# Patient Record
Sex: MALE | URBAN - METROPOLITAN AREA
[De-identification: names, ages, dates, MRNs, and addresses within clinical notes are randomized per-mention and may not be internally consistent; named-entity substitution may affect disease eponyms.]

---

## 2020-10-13 ENCOUNTER — NEW PATIENT (OUTPATIENT)
Dept: URBAN - METROPOLITAN AREA CLINIC 24 | Facility: CLINIC | Age: 65
End: 2020-10-13
Payer: COMMERCIAL

## 2020-10-13 PROCEDURE — 92235 FLUORESCEIN ANGRPH MLTIFRAME: CPT | Performed by: OPHTHALMOLOGY

## 2020-10-13 PROCEDURE — 92004 COMPRE OPH EXAM NEW PT 1/>: CPT | Performed by: OPHTHALMOLOGY

## 2020-10-13 PROCEDURE — 67210 TREATMENT OF RETINAL LESION: CPT | Performed by: OPHTHALMOLOGY

## 2020-10-13 PROCEDURE — 92134 CPTRZ OPH DX IMG PST SGM RTA: CPT | Performed by: OPHTHALMOLOGY

## 2020-10-13 ASSESSMENT — INTRAOCULAR PRESSURE
OD: 12
OS: 11

## 2020-10-27 ENCOUNTER — FOLLOW UP ESTABLISHED (OUTPATIENT)
Dept: URBAN - METROPOLITAN AREA CLINIC 24 | Facility: CLINIC | Age: 65
End: 2020-10-27
Payer: COMMERCIAL

## 2020-10-27 DIAGNOSIS — Z79.4 LONG TERM (CURRENT) USE OF INSULIN: ICD-10-CM

## 2020-10-27 PROCEDURE — 92134 CPTRZ OPH DX IMG PST SGM RTA: CPT | Performed by: OPHTHALMOLOGY

## 2020-10-27 PROCEDURE — 67228 TREATMENT X10SV RETINOPATHY: CPT | Performed by: OPHTHALMOLOGY

## 2020-10-27 ASSESSMENT — INTRAOCULAR PRESSURE
OS: 8
OD: 9

## 2020-11-16 ENCOUNTER — FOLLOW UP ESTABLISHED (OUTPATIENT)
Dept: URBAN - METROPOLITAN AREA CLINIC 24 | Facility: CLINIC | Age: 65
End: 2020-11-16
Payer: COMMERCIAL

## 2020-11-16 PROCEDURE — 92134 CPTRZ OPH DX IMG PST SGM RTA: CPT | Performed by: OPHTHALMOLOGY

## 2020-11-16 ASSESSMENT — INTRAOCULAR PRESSURE
OD: 8
OS: 8

## 2020-12-22 ENCOUNTER — FOLLOW UP ESTABLISHED (OUTPATIENT)
Dept: URBAN - METROPOLITAN AREA CLINIC 24 | Facility: CLINIC | Age: 65
End: 2020-12-22
Payer: COMMERCIAL

## 2020-12-22 PROCEDURE — 92134 CPTRZ OPH DX IMG PST SGM RTA: CPT | Performed by: OPHTHALMOLOGY

## 2020-12-22 ASSESSMENT — INTRAOCULAR PRESSURE
OS: 15
OD: 18

## 2021-01-25 ENCOUNTER — FOLLOW UP ESTABLISHED (OUTPATIENT)
Dept: URBAN - METROPOLITAN AREA CLINIC 24 | Facility: CLINIC | Age: 66
End: 2021-01-25
Payer: COMMERCIAL

## 2021-01-25 PROCEDURE — 92134 CPTRZ OPH DX IMG PST SGM RTA: CPT | Performed by: OPHTHALMOLOGY

## 2021-01-25 ASSESSMENT — INTRAOCULAR PRESSURE
OD: 14
OS: 12

## 2021-02-22 ENCOUNTER — FOLLOW UP ESTABLISHED (OUTPATIENT)
Dept: URBAN - METROPOLITAN AREA CLINIC 24 | Facility: CLINIC | Age: 66
End: 2021-02-22
Payer: COMMERCIAL

## 2021-02-22 PROCEDURE — 92014 COMPRE OPH EXAM EST PT 1/>: CPT | Performed by: OPHTHALMOLOGY

## 2021-02-22 PROCEDURE — 92134 CPTRZ OPH DX IMG PST SGM RTA: CPT | Performed by: OPHTHALMOLOGY

## 2021-02-22 ASSESSMENT — INTRAOCULAR PRESSURE
OD: 9
OS: 10

## 2021-03-22 ENCOUNTER — FOLLOW UP ESTABLISHED (OUTPATIENT)
Dept: URBAN - METROPOLITAN AREA CLINIC 24 | Facility: CLINIC | Age: 66
End: 2021-03-22
Payer: COMMERCIAL

## 2021-03-22 DIAGNOSIS — E11.3413 TYPE 2 DIABETES MELLITUS WITH SEVERE NONPROLIFERATIVE DIABETIC RETINOPATHY WITH MACULAR EDEMA, BILATERAL: Primary | ICD-10-CM

## 2021-03-22 PROCEDURE — 92134 CPTRZ OPH DX IMG PST SGM RTA: CPT | Performed by: OPHTHALMOLOGY

## 2021-03-22 ASSESSMENT — INTRAOCULAR PRESSURE
OD: 9
OS: 10

## 2021-04-27 ENCOUNTER — OFFICE VISIT (OUTPATIENT)
Dept: URBAN - METROPOLITAN AREA CLINIC 24 | Facility: CLINIC | Age: 66
End: 2021-04-27
Payer: COMMERCIAL

## 2021-04-27 PROCEDURE — 92134 CPTRZ OPH DX IMG PST SGM RTA: CPT | Performed by: OPHTHALMOLOGY

## 2021-04-27 ASSESSMENT — INTRAOCULAR PRESSURE
OS: 13
OD: 12

## 2021-04-27 NOTE — IMPRESSION/PLAN
Impression: Type 2 diabetes mellitus with ophthalmic complications Plan: Stressed the importance compliance, blood glucose, and blood pressure control in preventing progression of retinopathy and/or maculopathy and potentially irreversible vision loss and blindness

## 2021-04-27 NOTE — IMPRESSION/PLAN
Impression: Diabetes mellitus Type 2 with severe non-proliferative retinopathy with macular edema, bilateral Plan: Visually significant DME OD>OS - now improving, continue [id number] avastin OU Avastin 3/3 OU given today without complication. R/B/A discussed at length.  Review in 4 weeks for dilated exam.

## 2021-06-08 ENCOUNTER — OFFICE VISIT (OUTPATIENT)
Dept: URBAN - METROPOLITAN AREA CLINIC 24 | Facility: CLINIC | Age: 66
End: 2021-06-08
Payer: COMMERCIAL

## 2021-06-08 DIAGNOSIS — E11.39 TYPE 2 DIABETES MELLITUS WITH OTHER DIABETIC OPHTHALMIC COMPLICATION: ICD-10-CM

## 2021-06-08 PROCEDURE — 92134 CPTRZ OPH DX IMG PST SGM RTA: CPT | Performed by: OPHTHALMOLOGY

## 2021-06-08 PROCEDURE — 92014 COMPRE OPH EXAM EST PT 1/>: CPT | Performed by: OPHTHALMOLOGY

## 2021-06-08 ASSESSMENT — INTRAOCULAR PRESSURE
OS: 12
OD: 10

## 2021-06-08 NOTE — IMPRESSION/PLAN
Impression: Diabetes mellitus Type 2 with severe non-proliferative retinopathy with macular edema, bilateral Plan: Visually significant DME OS>OD - persists despite [id number] avastin OU. Plan on micropulse OS in 2 weeks followed by micropulse OD followed by adjunctive Eylea OU Avastin OU given today without complication. R/B/A discussed at length.  Review in 2-4 weeks for micropulse OS

## 2021-07-12 ENCOUNTER — OFFICE VISIT (OUTPATIENT)
Dept: URBAN - METROPOLITAN AREA CLINIC 24 | Facility: CLINIC | Age: 66
End: 2021-07-12
Payer: COMMERCIAL

## 2021-07-12 PROCEDURE — 92134 CPTRZ OPH DX IMG PST SGM RTA: CPT | Performed by: OPHTHALMOLOGY

## 2021-07-12 PROCEDURE — 67220 TREATMENT OF CHOROID LESION: CPT | Performed by: OPHTHALMOLOGY

## 2021-07-12 ASSESSMENT — INTRAOCULAR PRESSURE
OS: 13
OD: 12

## 2021-07-12 NOTE — IMPRESSION/PLAN
Impression: Diabetes mellitus Type 2 with severe non-proliferative retinopathy with macular edema, bilateral Plan: Visually significant DME OS>OD - persists despite [id number] avastin OU. Plan on micropulse OS in 2 weeks followed by micropulse OD followed by adjunctive Eylea OU Micropulse OS performed today (2/98/14) without complication. R/B/A discussed at length.  Review in 2-4 weeks for micropulse OD

## 2021-08-17 ENCOUNTER — OFFICE VISIT (OUTPATIENT)
Dept: URBAN - METROPOLITAN AREA CLINIC 24 | Facility: CLINIC | Age: 66
End: 2021-08-17
Payer: COMMERCIAL

## 2021-08-17 PROCEDURE — 92134 CPTRZ OPH DX IMG PST SGM RTA: CPT | Performed by: OPHTHALMOLOGY

## 2021-08-17 PROCEDURE — 67210 TREATMENT OF RETINAL LESION: CPT | Performed by: OPHTHALMOLOGY

## 2021-08-17 ASSESSMENT — INTRAOCULAR PRESSURE
OD: 11
OS: 12

## 2021-08-17 NOTE — IMPRESSION/PLAN
Impression: Diabetes mellitus Type 2 with severe non-proliferative retinopathy with macular edema, bilateral Plan: Visually significant DME OS>OD - persists despite [id number] avastin OU. Micropulse OS performed 6/67/38 without complication. Micropulse OD performed today (3/90/47) without complication. R/B/A discussed at length.  Review in 2-4 weeks for adjunctive Eylea OU

## 2021-08-31 ENCOUNTER — OFFICE VISIT (OUTPATIENT)
Dept: URBAN - METROPOLITAN AREA CLINIC 24 | Facility: CLINIC | Age: 66
End: 2021-08-31
Payer: COMMERCIAL

## 2021-08-31 PROCEDURE — 92134 CPTRZ OPH DX IMG PST SGM RTA: CPT | Performed by: OPHTHALMOLOGY

## 2021-08-31 ASSESSMENT — INTRAOCULAR PRESSURE
OS: 12
OD: 10

## 2021-08-31 NOTE — IMPRESSION/PLAN
Impression: Diabetes mellitus Type 2 with severe non-proliferative retinopathy with macular edema, bilateral Plan: Visually significant DME OS>OD - persists despite [id number] avastin OU. Micropulse OS performed 8/88/15 without complication. Micropulse OD performed 7/69/30 without complication. Continue with adjunctive Eylea OU as planned Eylea 1/3 OU administered today without complication. R/B/A discussed at length.  Review in 4 weeks for Eylea 2/3 OU

## 2021-09-28 ENCOUNTER — OFFICE VISIT (OUTPATIENT)
Dept: URBAN - METROPOLITAN AREA CLINIC 24 | Facility: CLINIC | Age: 66
End: 2021-09-28
Payer: COMMERCIAL

## 2021-09-28 PROCEDURE — 92134 CPTRZ OPH DX IMG PST SGM RTA: CPT | Performed by: OPHTHALMOLOGY

## 2021-09-28 ASSESSMENT — INTRAOCULAR PRESSURE
OS: 12
OD: 18

## 2021-09-28 NOTE — IMPRESSION/PLAN
Impression: Diabetes mellitus Type 2 with severe non-proliferative retinopathy with macular edema, bilateral Plan: Visually significant DME OS>OD - persists despite [id number] avastin OU. Micropulse OS performed 6/88/00 without complication. Micropulse OD performed 7/05/65 without complication. Continue with adjunctive Eylea OU as planned Eylea 2/3 OU administered today without complication. R/B/A discussed at length.  Review in 4 weeks for Eylea 3/3 OU

## 2021-11-11 ENCOUNTER — OFFICE VISIT (OUTPATIENT)
Dept: URBAN - METROPOLITAN AREA CLINIC 30 | Facility: CLINIC | Age: 66
End: 2021-11-11
Payer: COMMERCIAL

## 2021-11-11 PROCEDURE — 92134 CPTRZ OPH DX IMG PST SGM RTA: CPT | Performed by: OPHTHALMOLOGY

## 2021-11-11 ASSESSMENT — INTRAOCULAR PRESSURE
OD: 10
OS: 13

## 2021-11-11 NOTE — IMPRESSION/PLAN
Impression: Diabetes mellitus Type 2 with severe non-proliferative retinopathy with macular edema, bilateral Plan: Visually significant DME OS>OD - persists despite [id number] avastin OU. Micropulse OS performed 2/59/75 without complication. Micropulse OD performed 8/85/31 without complication. Continue with adjunctive Eylea OU as planned Eylea 3/3 OU administered today without complication. R/B/A discussed at length.  Review in 4 weeks for exam

## 2021-12-07 ENCOUNTER — OFFICE VISIT (OUTPATIENT)
Dept: URBAN - METROPOLITAN AREA CLINIC 24 | Facility: CLINIC | Age: 66
End: 2021-12-07
Payer: COMMERCIAL

## 2021-12-07 PROCEDURE — 92014 COMPRE OPH EXAM EST PT 1/>: CPT | Performed by: OPHTHALMOLOGY

## 2021-12-07 PROCEDURE — 92134 CPTRZ OPH DX IMG PST SGM RTA: CPT | Performed by: OPHTHALMOLOGY

## 2021-12-07 ASSESSMENT — INTRAOCULAR PRESSURE
OS: 14
OD: 12

## 2021-12-07 NOTE — IMPRESSION/PLAN
Impression: Diabetes mellitus Type 2 with severe non-proliferative retinopathy with macular edema, bilateral Plan: Visually significant DME OS>OD - persists despite [id number] avastin OU. Micropulse OS performed 6/94/42 without complication. Micropulse OD performed 3/01/89 without complication. Continue with adjunctive Eylea OU as planned Eylea 1/3 OU administered today without complication. R/B/A discussed at length.  Review in 4 weeks for Eylea 2/3 OU

## 2022-01-04 ENCOUNTER — OFFICE VISIT (OUTPATIENT)
Dept: URBAN - METROPOLITAN AREA CLINIC 24 | Facility: CLINIC | Age: 67
End: 2022-01-04
Payer: COMMERCIAL

## 2022-01-04 PROCEDURE — 92134 CPTRZ OPH DX IMG PST SGM RTA: CPT | Performed by: OPHTHALMOLOGY

## 2022-01-04 ASSESSMENT — INTRAOCULAR PRESSURE
OS: 9
OD: 13

## 2022-01-04 NOTE — IMPRESSION/PLAN
Impression: Diabetes mellitus Type 2 with severe non-proliferative retinopathy with macular edema, bilateral Plan: Visually significant DME OS>OD - persists despite [id number] avastin OU. Micropulse OS performed 3/09/62 without complication. Micropulse OD performed 7/89/15 without complication. Continue with adjunctive Eylea OU as planned Eylea 2/3 OU administered today without complication. R/B/A discussed at length. Review in 4 weeks for Eylea 3/3 OU.

## 2022-02-01 ENCOUNTER — OFFICE VISIT (OUTPATIENT)
Dept: URBAN - METROPOLITAN AREA CLINIC 24 | Facility: CLINIC | Age: 67
End: 2022-02-01
Payer: COMMERCIAL

## 2022-02-01 PROCEDURE — 92134 CPTRZ OPH DX IMG PST SGM RTA: CPT | Performed by: OPHTHALMOLOGY

## 2022-02-01 ASSESSMENT — INTRAOCULAR PRESSURE
OS: 10
OD: 12

## 2022-02-01 NOTE — IMPRESSION/PLAN
Impression: Diabetes mellitus Type 2 with severe non-proliferative retinopathy with macular edema, bilateral Plan: Visually significant DME OS>OD - persists despite [id number] avastin OU. Micropulse OS performed 1/53/39 without complication. Micropulse OD performed 0/19/19 without complication. Continue with adjunctive Eylea OU as planned Eylea 3/3 OU administered today without complication. R/B/A discussed at length.  Review in 4 weeks for a dilated exam.

## 2022-03-01 ENCOUNTER — OFFICE VISIT (OUTPATIENT)
Dept: URBAN - METROPOLITAN AREA CLINIC 24 | Facility: CLINIC | Age: 67
End: 2022-03-01
Payer: COMMERCIAL

## 2022-03-01 PROCEDURE — 92014 COMPRE OPH EXAM EST PT 1/>: CPT | Performed by: OPHTHALMOLOGY

## 2022-03-01 PROCEDURE — 92134 CPTRZ OPH DX IMG PST SGM RTA: CPT | Performed by: OPHTHALMOLOGY

## 2022-03-01 ASSESSMENT — INTRAOCULAR PRESSURE
OS: 11
OD: 10

## 2022-03-01 NOTE — IMPRESSION/PLAN
Impression: Diabetes mellitus Type 2 with severe non-proliferative retinopathy with macular edema, bilateral Plan: Visually significant DME OS>OD - persists despite [id number] avastin OU. Micropulse OS performed 8/52/17 without complication. Micropulse OD performed 7/91/65 without complication. Continue with adjunctive Eylea OU as planned Eylea 1/3 OU administered today without complication. R/B/A discussed at length.  Review in 4 weeks for Eylea 2/3 OU

## 2022-04-04 ENCOUNTER — OFFICE VISIT (OUTPATIENT)
Dept: URBAN - METROPOLITAN AREA CLINIC 24 | Facility: CLINIC | Age: 67
End: 2022-04-04
Payer: COMMERCIAL

## 2022-04-04 PROCEDURE — 92134 CPTRZ OPH DX IMG PST SGM RTA: CPT | Performed by: OPHTHALMOLOGY

## 2022-04-04 ASSESSMENT — INTRAOCULAR PRESSURE
OS: 14
OD: 14

## 2022-04-04 NOTE — IMPRESSION/PLAN
Impression: Diabetes mellitus Type 2 with severe non-proliferative retinopathy with macular edema, bilateral Plan: Visually significant DME OS>OD - persists despite [id number] avastin OU. Micropulse OS performed 5/32/62 without complication. Micropulse OD performed 8/54/02 without complication. Continue with adjunctive Eylea OU as planned Eylea 2/3 OU administered today without complication. R/B/A discussed at length.  Review in 4 weeks for Eylea 3/3 OU

## 2022-05-02 ENCOUNTER — OFFICE VISIT (OUTPATIENT)
Dept: URBAN - METROPOLITAN AREA CLINIC 24 | Facility: CLINIC | Age: 67
End: 2022-05-02
Payer: COMMERCIAL

## 2022-05-02 DIAGNOSIS — E11.3413 TYPE 2 DIABETES MELLITUS WITH SEVERE NONPROLIFERATIVE DIABETIC RETINOPATHY WITH MACULAR EDEMA, BILATERAL: Primary | ICD-10-CM

## 2022-05-02 DIAGNOSIS — Z79.4 LONG TERM (CURRENT) USE OF INSULIN: ICD-10-CM

## 2022-05-02 DIAGNOSIS — E11.39 TYPE 2 DIABETES MELLITUS WITH OTHER DIABETIC OPHTHALMIC COMPLICATION: ICD-10-CM

## 2022-05-02 PROCEDURE — 92134 CPTRZ OPH DX IMG PST SGM RTA: CPT | Performed by: OPHTHALMOLOGY

## 2022-05-02 ASSESSMENT — INTRAOCULAR PRESSURE
OS: 13
OD: 15

## 2022-05-02 NOTE — IMPRESSION/PLAN
Impression: Diabetes mellitus Type 2 with severe non-proliferative retinopathy with macular edema, bilateral Plan: Visually significant DME OS>OD - persists despite [id number] avastin OU. Micropulse OS performed 4/39/06 without complication. Micropulse OD performed 2/67/01 without complication. Continue with adjunctive Eylea OU as planned Eylea 3/3 OU administered today without complication. R/B/A discussed at length.  Review in 4 weeks for dilated exam, possible FA

## 2022-06-07 ENCOUNTER — OFFICE VISIT (OUTPATIENT)
Dept: URBAN - METROPOLITAN AREA CLINIC 24 | Facility: CLINIC | Age: 67
End: 2022-06-07
Payer: COMMERCIAL

## 2022-06-07 DIAGNOSIS — E11.3413 TYPE 2 DIABETES MELLITUS WITH SEVERE NONPROLIFERATIVE DIABETIC RETINOPATHY WITH MACULAR EDEMA, BILATERAL: Primary | ICD-10-CM

## 2022-06-07 DIAGNOSIS — Z79.4 LONG TERM (CURRENT) USE OF INSULIN: ICD-10-CM

## 2022-06-07 DIAGNOSIS — E11.39 TYPE 2 DIABETES MELLITUS WITH OTHER DIABETIC OPHTHALMIC COMPLICATION: ICD-10-CM

## 2022-06-07 PROCEDURE — 92134 CPTRZ OPH DX IMG PST SGM RTA: CPT | Performed by: OPHTHALMOLOGY

## 2022-06-07 PROCEDURE — 92235 FLUORESCEIN ANGRPH MLTIFRAME: CPT | Performed by: OPHTHALMOLOGY

## 2022-06-07 PROCEDURE — 92014 COMPRE OPH EXAM EST PT 1/>: CPT | Performed by: OPHTHALMOLOGY

## 2022-06-07 ASSESSMENT — INTRAOCULAR PRESSURE
OS: 13
OD: 14

## 2022-06-07 NOTE — IMPRESSION/PLAN
Impression: Diabetes mellitus Type 2 with severe non-proliferative retinopathy with macular edema, bilateral Plan: Visually significant DME OU - now minimal OD with series of Eylea, resolving OS. Continue [id number] Eylea OS and extend to 8 weeks Eylea OD Eylea 1/3 OU administered today without complication. R/B/A discussed at length.  Review in 4 weeks for Eylea 2/3 OS

## 2022-07-19 ENCOUNTER — OFFICE VISIT (OUTPATIENT)
Dept: URBAN - METROPOLITAN AREA CLINIC 24 | Facility: CLINIC | Age: 67
End: 2022-07-19
Payer: COMMERCIAL

## 2022-07-19 DIAGNOSIS — Z79.4 LONG TERM (CURRENT) USE OF INSULIN: ICD-10-CM

## 2022-07-19 DIAGNOSIS — E11.3413 TYPE 2 DIABETES MELLITUS WITH SEVERE NONPROLIFERATIVE DIABETIC RETINOPATHY WITH MACULAR EDEMA, BILATERAL: Primary | ICD-10-CM

## 2022-07-19 PROCEDURE — 67028 INJECTION EYE DRUG: CPT | Performed by: OPHTHALMOLOGY

## 2022-07-19 PROCEDURE — 92134 CPTRZ OPH DX IMG PST SGM RTA: CPT | Performed by: OPHTHALMOLOGY

## 2022-07-19 ASSESSMENT — INTRAOCULAR PRESSURE
OS: 13
OD: 15

## 2022-07-19 NOTE — IMPRESSION/PLAN
Impression: Long term (current) use of insulin Plan: Stressed the importance compliance, blood glucose, and blood pressure control in preventing progression of retinopathy and/or maculopathy and potentially irreversible vision loss and blindness

## 2022-07-19 NOTE — IMPRESSION/PLAN
Impression: Diabetes mellitus Type 2 with severe non-proliferative retinopathy with macular edema, bilateral Plan: Visually significant DME OU - now minimal OD with series of Eylea, resolving OS. Continue [id number] Eylea OS and extend to 8 weeks Eylea OD Eylea 2/3 OS administered today without complication. R/B/A discussed at length.  Review in 4 weeks for Eylea 2/3 OU

## 2022-08-16 ENCOUNTER — OFFICE VISIT (OUTPATIENT)
Dept: URBAN - METROPOLITAN AREA CLINIC 24 | Facility: CLINIC | Age: 67
End: 2022-08-16
Payer: COMMERCIAL

## 2022-08-16 DIAGNOSIS — Z79.4 LONG TERM (CURRENT) USE OF INSULIN: ICD-10-CM

## 2022-08-16 DIAGNOSIS — E11.3413 TYPE 2 DIABETES MELLITUS WITH SEVERE NONPROLIFERATIVE DIABETIC RETINOPATHY WITH MACULAR EDEMA, BILATERAL: Primary | ICD-10-CM

## 2022-08-16 PROCEDURE — 92134 CPTRZ OPH DX IMG PST SGM RTA: CPT | Performed by: OPHTHALMOLOGY

## 2022-08-16 ASSESSMENT — INTRAOCULAR PRESSURE
OD: 14
OS: 14

## 2022-08-16 NOTE — IMPRESSION/PLAN
Impression: Diabetes mellitus Type 2 with severe non-proliferative retinopathy with macular edema, bilateral Plan: Visually significant DME OU - now minimal OD with series of Eylea, resolving OS. Continue [id number] Eylea OS and extend to 8 weeks Eylea OD Eylea 3/3 OU administered today without complication. R/B/A discussed at length.  Review in 4 weeks for dilated exam, likely Eylea OS

## 2022-10-03 ENCOUNTER — OFFICE VISIT (OUTPATIENT)
Dept: URBAN - METROPOLITAN AREA CLINIC 24 | Facility: CLINIC | Age: 67
End: 2022-10-03
Payer: COMMERCIAL

## 2022-10-03 DIAGNOSIS — E11.3413 TYPE 2 DIABETES MELLITUS WITH SEVERE NONPROLIFERATIVE DIABETIC RETINOPATHY WITH MACULAR EDEMA, BILATERAL: Primary | ICD-10-CM

## 2022-10-03 DIAGNOSIS — Z79.4 LONG TERM (CURRENT) USE OF INSULIN: ICD-10-CM

## 2022-10-03 PROCEDURE — 92134 CPTRZ OPH DX IMG PST SGM RTA: CPT | Performed by: OPHTHALMOLOGY

## 2022-10-03 PROCEDURE — 67028 INJECTION EYE DRUG: CPT | Performed by: OPHTHALMOLOGY

## 2022-10-03 PROCEDURE — 92014 COMPRE OPH EXAM EST PT 1/>: CPT | Performed by: OPHTHALMOLOGY

## 2022-10-03 ASSESSMENT — INTRAOCULAR PRESSURE
OS: 12
OD: 13

## 2022-10-03 NOTE — IMPRESSION/PLAN
Impression: Diabetes mellitus Type 2 with severe non-proliferative retinopathy with macular edema, bilateral Plan: DME OU - now minimal OD with series of Eylea, resolving OS. Continue [id number] Eylea OS and DC Eylea OD Eylea 1/3 OS administered today without complication. R/B/A discussed at length.  Review in 4 weeks for Eylea 2/3 OS

## 2022-10-31 ENCOUNTER — OFFICE VISIT (OUTPATIENT)
Dept: URBAN - METROPOLITAN AREA CLINIC 24 | Facility: CLINIC | Age: 67
End: 2022-10-31
Payer: COMMERCIAL

## 2022-10-31 DIAGNOSIS — Z79.4 LONG TERM (CURRENT) USE OF INSULIN: ICD-10-CM

## 2022-10-31 DIAGNOSIS — E11.3413 TYPE 2 DIABETES MELLITUS WITH SEVERE NONPROLIFERATIVE DIABETIC RETINOPATHY WITH MACULAR EDEMA, BILATERAL: Primary | ICD-10-CM

## 2022-10-31 PROCEDURE — 92134 CPTRZ OPH DX IMG PST SGM RTA: CPT | Performed by: OPHTHALMOLOGY

## 2022-10-31 PROCEDURE — 67028 INJECTION EYE DRUG: CPT | Performed by: OPHTHALMOLOGY

## 2022-10-31 ASSESSMENT — INTRAOCULAR PRESSURE
OS: 13
OD: 15

## 2022-10-31 NOTE — IMPRESSION/PLAN
Impression: Diabetes mellitus Type 2 with severe non-proliferative retinopathy with macular edema, bilateral Plan: DME OU - now minimal OD with series of Eylea, resolving OS. Continue [id number] Eylea OS and DC Eylea OD Eylea 2/3 OS administered today without complication. R/B/A discussed at length.  Review in 4 weeks for Eylea 3/3 OS

## 2022-12-06 ENCOUNTER — OFFICE VISIT (OUTPATIENT)
Dept: URBAN - METROPOLITAN AREA CLINIC 24 | Facility: CLINIC | Age: 67
End: 2022-12-06
Payer: MEDICARE

## 2022-12-06 DIAGNOSIS — E11.3413 TYPE 2 DIABETES MELLITUS WITH SEVERE NONPROLIFERATIVE DIABETIC RETINOPATHY WITH MACULAR EDEMA, BILATERAL: Primary | ICD-10-CM

## 2022-12-06 DIAGNOSIS — Z79.4 LONG TERM (CURRENT) USE OF INSULIN: ICD-10-CM

## 2022-12-06 PROCEDURE — 67028 INJECTION EYE DRUG: CPT | Performed by: OPHTHALMOLOGY

## 2022-12-06 PROCEDURE — 92134 CPTRZ OPH DX IMG PST SGM RTA: CPT | Performed by: OPHTHALMOLOGY

## 2022-12-06 ASSESSMENT — INTRAOCULAR PRESSURE
OD: 13
OS: 12

## 2022-12-06 NOTE — IMPRESSION/PLAN
Impression: Diabetes mellitus Type 2 with severe non-proliferative retinopathy with macular edema, bilateral Plan: DME OU - now minimal OD with series of Eylea, resolving OS. Continue [id number] Eylea OS and DC Eylea OD Eylea 3/3 OS administered today without complication. R/B/A discussed at length.  Review in 4 weeks for exam

## 2023-01-09 ENCOUNTER — OFFICE VISIT (OUTPATIENT)
Dept: URBAN - METROPOLITAN AREA CLINIC 24 | Facility: CLINIC | Age: 68
End: 2023-01-09
Payer: MEDICARE

## 2023-01-09 DIAGNOSIS — E11.3413 TYPE 2 DIABETES MELLITUS WITH SEVERE NONPROLIFERATIVE DIABETIC RETINOPATHY WITH MACULAR EDEMA, BILATERAL: Primary | ICD-10-CM

## 2023-01-09 DIAGNOSIS — Z79.4 LONG TERM (CURRENT) USE OF INSULIN: ICD-10-CM

## 2023-01-09 PROCEDURE — 99214 OFFICE O/P EST MOD 30 MIN: CPT | Performed by: OPHTHALMOLOGY

## 2023-01-09 PROCEDURE — 92134 CPTRZ OPH DX IMG PST SGM RTA: CPT | Performed by: OPHTHALMOLOGY

## 2023-01-09 PROCEDURE — 67028 INJECTION EYE DRUG: CPT | Performed by: OPHTHALMOLOGY

## 2023-01-09 ASSESSMENT — INTRAOCULAR PRESSURE
OS: 12
OD: 12

## 2023-01-09 NOTE — IMPRESSION/PLAN
Impression: Diabetes mellitus Type 2 with severe non-proliferative retinopathy with macular edema, bilateral Plan: DME OU - now minimal OD with series of Eylea, resolving OS. Continue [id number] Eylea OS and observe OD off Eylea Eylea 1/3 OS administered today without complication. R/B/A discussed at length.  Review in 4 weeks for Eylea 2/3 OS

## 2023-02-06 ENCOUNTER — PROCEDURE (OUTPATIENT)
Dept: URBAN - METROPOLITAN AREA CLINIC 24 | Facility: CLINIC | Age: 68
End: 2023-02-06
Payer: COMMERCIAL

## 2023-02-06 DIAGNOSIS — E11.3413 TYPE 2 DIABETES MELLITUS WITH SEVERE NONPROLIFERATIVE DIABETIC RETINOPATHY WITH MACULAR EDEMA, BILATERAL: Primary | ICD-10-CM

## 2023-02-06 DIAGNOSIS — Z79.4 LONG TERM (CURRENT) USE OF INSULIN: ICD-10-CM

## 2023-02-06 PROCEDURE — 92134 CPTRZ OPH DX IMG PST SGM RTA: CPT | Performed by: OPHTHALMOLOGY

## 2023-02-06 PROCEDURE — 67028 INJECTION EYE DRUG: CPT | Performed by: OPHTHALMOLOGY

## 2023-02-06 ASSESSMENT — INTRAOCULAR PRESSURE
OD: 10
OS: 12

## 2023-02-06 NOTE — IMPRESSION/PLAN
Impression: Diabetes mellitus Type 2 with severe non-proliferative retinopathy with macular edema, bilateral Plan: DME OU - now minimal OD with series of Eylea, resolving OS. Continue [id number] Eylea OS and observe OD off Eylea Eylea 2/3 OS administered today without complication. R/B/A discussed at length.  Review in 4 weeks for Eylea 3/3 OS

## 2023-03-06 ENCOUNTER — OFFICE VISIT (OUTPATIENT)
Dept: URBAN - METROPOLITAN AREA CLINIC 24 | Facility: CLINIC | Age: 68
End: 2023-03-06
Payer: COMMERCIAL

## 2023-03-06 DIAGNOSIS — E11.3413 TYPE 2 DIABETES MELLITUS WITH SEVERE NONPROLIFERATIVE DIABETIC RETINOPATHY WITH MACULAR EDEMA, BILATERAL: Primary | ICD-10-CM

## 2023-03-06 DIAGNOSIS — Z79.4 LONG TERM (CURRENT) USE OF INSULIN: ICD-10-CM

## 2023-03-06 PROCEDURE — 92134 CPTRZ OPH DX IMG PST SGM RTA: CPT | Performed by: OPHTHALMOLOGY

## 2023-03-06 PROCEDURE — 67028 INJECTION EYE DRUG: CPT | Performed by: OPHTHALMOLOGY

## 2023-03-06 ASSESSMENT — INTRAOCULAR PRESSURE
OD: 11
OS: 12

## 2023-03-06 NOTE — IMPRESSION/PLAN
Impression: Diabetes mellitus Type 2 with severe non-proliferative retinopathy with macular edema, bilateral Plan: DME OU - now minimal OD with series of Eylea, persists OS despite [id number] Eylea OS. Switch to Vabysmo OS and observe OD off Eylea Eylea 3/3 OS administered today without complication. R/B/A discussed at length.  Review in 4 weeks for a dilated exam.

## 2023-04-11 ENCOUNTER — OFFICE VISIT (OUTPATIENT)
Dept: URBAN - METROPOLITAN AREA CLINIC 24 | Facility: CLINIC | Age: 68
End: 2023-04-11
Payer: COMMERCIAL

## 2023-04-11 DIAGNOSIS — Z79.4 LONG TERM (CURRENT) USE OF INSULIN: ICD-10-CM

## 2023-04-11 DIAGNOSIS — E11.3413 TYPE 2 DIABETES MELLITUS WITH SEVERE NONPROLIFERATIVE DIABETIC RETINOPATHY WITH MACULAR EDEMA, BILATERAL: Primary | ICD-10-CM

## 2023-04-11 PROCEDURE — 92134 CPTRZ OPH DX IMG PST SGM RTA: CPT | Performed by: OPHTHALMOLOGY

## 2023-04-11 PROCEDURE — 67028 INJECTION EYE DRUG: CPT | Performed by: OPHTHALMOLOGY

## 2023-04-11 PROCEDURE — 92235 FLUORESCEIN ANGRPH MLTIFRAME: CPT | Performed by: OPHTHALMOLOGY

## 2023-04-11 PROCEDURE — 99214 OFFICE O/P EST MOD 30 MIN: CPT | Performed by: OPHTHALMOLOGY

## 2023-04-11 ASSESSMENT — INTRAOCULAR PRESSURE
OD: 12
OS: 13

## 2023-04-11 NOTE — IMPRESSION/PLAN
Impression: Diabetes mellitus Type 2 with severe non-proliferative retinopathy with macular edema, bilateral Plan: DME OU - now minimal OD with series of Eylea, persists OS despite [id number] Eylea OS. Switch to Vabysmo OS and observe OD off Eylea. Vabysmo 1/3 OS administered today without complication. R/B/A discussed at length.  Review in 4 weeks for Vabysmo 2/3 OS

## 2023-05-09 ENCOUNTER — OFFICE VISIT (OUTPATIENT)
Dept: URBAN - METROPOLITAN AREA CLINIC 24 | Facility: CLINIC | Age: 68
End: 2023-05-09
Payer: COMMERCIAL

## 2023-05-09 DIAGNOSIS — Z79.4 LONG TERM (CURRENT) USE OF INSULIN: ICD-10-CM

## 2023-05-09 DIAGNOSIS — E11.3413 TYPE 2 DIABETES MELLITUS WITH SEVERE NONPROLIFERATIVE DIABETIC RETINOPATHY WITH MACULAR EDEMA, BILATERAL: Primary | ICD-10-CM

## 2023-05-09 PROCEDURE — 92134 CPTRZ OPH DX IMG PST SGM RTA: CPT | Performed by: OPHTHALMOLOGY

## 2023-05-09 PROCEDURE — 67028 INJECTION EYE DRUG: CPT | Performed by: OPHTHALMOLOGY

## 2023-05-09 ASSESSMENT — INTRAOCULAR PRESSURE
OD: 5
OS: 4

## 2023-05-09 NOTE — IMPRESSION/PLAN
Impression: Diabetes mellitus Type 2 with severe non-proliferative retinopathy with macular edema, bilateral Plan: DME OU - now minimal OD with series of Eylea, persists OS despite [id number] Eylea OS. Switch to Vabysmo OS and observe OD off Eylea. Vabysmo 2/3 OS administered today without complication. R/B/A discussed at length.  Review in 4 weeks for Vabysmo 3/3 OS

## 2023-06-06 ENCOUNTER — OFFICE VISIT (OUTPATIENT)
Dept: URBAN - METROPOLITAN AREA CLINIC 24 | Facility: CLINIC | Age: 68
End: 2023-06-06
Payer: COMMERCIAL

## 2023-06-06 DIAGNOSIS — Z79.4 LONG TERM (CURRENT) USE OF INSULIN: ICD-10-CM

## 2023-06-06 DIAGNOSIS — E11.3413 TYPE 2 DIABETES MELLITUS WITH SEVERE NONPROLIFERATIVE DIABETIC RETINOPATHY WITH MACULAR EDEMA, BILATERAL: Primary | ICD-10-CM

## 2023-06-06 PROCEDURE — 92134 CPTRZ OPH DX IMG PST SGM RTA: CPT | Performed by: OPHTHALMOLOGY

## 2023-06-06 PROCEDURE — 67028 INJECTION EYE DRUG: CPT | Performed by: OPHTHALMOLOGY

## 2023-06-06 ASSESSMENT — INTRAOCULAR PRESSURE
OD: 8
OS: 6

## 2023-06-06 NOTE — IMPRESSION/PLAN
Impression: Diabetes mellitus Type 2 with severe non-proliferative retinopathy with macular edema, bilateral Plan: DME OU - now minimal OD with series of Eylea, persists OS despite [id number] Eylea OS. Switch to Vabysmo OS and observe OD off Eylea. Vabysmo 3/3 OS administered today without complication. R/B/A discussed at length.  Review in 4 weeks for a dilated exam.

## 2023-07-18 ENCOUNTER — OFFICE VISIT (OUTPATIENT)
Dept: URBAN - METROPOLITAN AREA CLINIC 24 | Facility: CLINIC | Age: 68
End: 2023-07-18
Payer: COMMERCIAL

## 2023-07-18 DIAGNOSIS — Z79.4 LONG TERM (CURRENT) USE OF INSULIN: ICD-10-CM

## 2023-07-18 DIAGNOSIS — E11.3413 TYPE 2 DIABETES MELLITUS WITH SEVERE NONPROLIFERATIVE DIABETIC RETINOPATHY WITH MACULAR EDEMA, BILATERAL: Primary | ICD-10-CM

## 2023-07-18 PROCEDURE — 92134 CPTRZ OPH DX IMG PST SGM RTA: CPT | Performed by: OPHTHALMOLOGY

## 2023-07-18 PROCEDURE — 67028 INJECTION EYE DRUG: CPT | Performed by: OPHTHALMOLOGY

## 2023-07-18 PROCEDURE — 99214 OFFICE O/P EST MOD 30 MIN: CPT | Performed by: OPHTHALMOLOGY

## 2023-07-18 ASSESSMENT — INTRAOCULAR PRESSURE
OS: 13
OD: 14

## 2023-08-29 ENCOUNTER — PROCEDURE (OUTPATIENT)
Dept: URBAN - METROPOLITAN AREA CLINIC 24 | Facility: CLINIC | Age: 68
End: 2023-08-29
Payer: COMMERCIAL

## 2023-08-29 DIAGNOSIS — E11.3413 TYPE 2 DIABETES MELLITUS WITH SEVERE NONPROLIFERATIVE DIABETIC RETINOPATHY WITH MACULAR EDEMA, BILATERAL: Primary | ICD-10-CM

## 2023-08-29 DIAGNOSIS — Z79.4 LONG TERM (CURRENT) USE OF INSULIN: ICD-10-CM

## 2023-08-29 PROCEDURE — 67028 INJECTION EYE DRUG: CPT | Performed by: OPHTHALMOLOGY

## 2023-08-29 PROCEDURE — 92134 CPTRZ OPH DX IMG PST SGM RTA: CPT | Performed by: OPHTHALMOLOGY

## 2023-08-29 ASSESSMENT — INTRAOCULAR PRESSURE
OD: 19
OS: 20

## 2023-10-02 ENCOUNTER — OFFICE VISIT (OUTPATIENT)
Dept: URBAN - METROPOLITAN AREA CLINIC 24 | Facility: CLINIC | Age: 68
End: 2023-10-02
Payer: COMMERCIAL

## 2023-10-02 DIAGNOSIS — Z79.4 LONG TERM (CURRENT) USE OF INSULIN: ICD-10-CM

## 2023-10-02 DIAGNOSIS — E11.3413 TYPE 2 DIABETES MELLITUS WITH SEVERE NONPROLIFERATIVE DIABETIC RETINOPATHY WITH MACULAR EDEMA, BILATERAL: Primary | ICD-10-CM

## 2023-10-02 PROCEDURE — 92134 CPTRZ OPH DX IMG PST SGM RTA: CPT | Performed by: OPHTHALMOLOGY

## 2023-10-02 ASSESSMENT — INTRAOCULAR PRESSURE
OS: 12
OD: 10

## 2023-11-07 ENCOUNTER — OFFICE VISIT (OUTPATIENT)
Dept: URBAN - METROPOLITAN AREA CLINIC 24 | Facility: CLINIC | Age: 68
End: 2023-11-07
Payer: COMMERCIAL

## 2023-11-07 DIAGNOSIS — Z79.4 LONG TERM (CURRENT) USE OF INSULIN: ICD-10-CM

## 2023-11-07 DIAGNOSIS — E11.3413 TYPE 2 DIABETES MELLITUS WITH SEVERE NONPROLIFERATIVE DIABETIC RETINOPATHY WITH MACULAR EDEMA, BILATERAL: Primary | ICD-10-CM

## 2023-11-07 PROCEDURE — 92134 CPTRZ OPH DX IMG PST SGM RTA: CPT | Performed by: OPHTHALMOLOGY

## 2023-11-07 PROCEDURE — 99214 OFFICE O/P EST MOD 30 MIN: CPT | Performed by: OPHTHALMOLOGY

## 2023-11-07 ASSESSMENT — INTRAOCULAR PRESSURE
OD: 13
OS: 13

## 2023-12-11 ENCOUNTER — OFFICE VISIT (OUTPATIENT)
Dept: URBAN - METROPOLITAN AREA CLINIC 24 | Facility: CLINIC | Age: 68
End: 2023-12-11
Payer: COMMERCIAL

## 2023-12-11 PROCEDURE — 92134 CPTRZ OPH DX IMG PST SGM RTA: CPT | Performed by: OPHTHALMOLOGY

## 2023-12-11 ASSESSMENT — INTRAOCULAR PRESSURE
OD: 11
OS: 12

## 2024-01-08 ENCOUNTER — OFFICE VISIT (OUTPATIENT)
Dept: URBAN - METROPOLITAN AREA CLINIC 24 | Facility: CLINIC | Age: 69
End: 2024-01-08
Payer: COMMERCIAL

## 2024-01-08 DIAGNOSIS — Z79.4 LONG TERM (CURRENT) USE OF INSULIN: ICD-10-CM

## 2024-01-08 PROCEDURE — 92134 CPTRZ OPH DX IMG PST SGM RTA: CPT | Performed by: OPHTHALMOLOGY

## 2024-01-08 ASSESSMENT — INTRAOCULAR PRESSURE
OD: 12
OS: 13

## 2024-02-05 ENCOUNTER — OFFICE VISIT (OUTPATIENT)
Dept: URBAN - METROPOLITAN AREA CLINIC 24 | Facility: CLINIC | Age: 69
End: 2024-02-05
Payer: COMMERCIAL

## 2024-02-05 DIAGNOSIS — E11.3413 TYPE 2 DIABETES MELLITUS WITH SEVERE NONPROLIFERATIVE DIABETIC RETINOPATHY WITH MACULAR EDEMA, BILATERAL: Primary | ICD-10-CM

## 2024-02-05 DIAGNOSIS — H25.13 AGE-RELATED NUCLEAR CATARACT, BILATERAL: ICD-10-CM

## 2024-02-05 PROCEDURE — 99214 OFFICE O/P EST MOD 30 MIN: CPT | Performed by: OPHTHALMOLOGY

## 2024-02-05 PROCEDURE — 92134 CPTRZ OPH DX IMG PST SGM RTA: CPT | Performed by: OPHTHALMOLOGY

## 2024-02-05 PROCEDURE — 92235 FLUORESCEIN ANGRPH MLTIFRAME: CPT | Performed by: OPHTHALMOLOGY

## 2024-02-05 ASSESSMENT — INTRAOCULAR PRESSURE
OS: 12
OD: 17

## 2024-02-23 ENCOUNTER — TECH ONLY (OUTPATIENT)
Dept: URBAN - METROPOLITAN AREA CLINIC 24 | Facility: CLINIC | Age: 69
End: 2024-02-23

## 2024-02-23 DIAGNOSIS — H25.13 AGE-RELATED NUCLEAR CATARACT, BILATERAL: Primary | ICD-10-CM

## 2024-03-04 ENCOUNTER — OFFICE VISIT (OUTPATIENT)
Dept: URBAN - METROPOLITAN AREA CLINIC 24 | Facility: CLINIC | Age: 69
End: 2024-03-04
Payer: COMMERCIAL

## 2024-03-04 DIAGNOSIS — E11.3413 TYPE 2 DIABETES MELLITUS WITH SEVERE NONPROLIFERATIVE DIABETIC RETINOPATHY WITH MACULAR EDEMA, BILATERAL: Primary | ICD-10-CM

## 2024-03-04 DIAGNOSIS — Z79.4 LONG TERM (CURRENT) USE OF INSULIN: ICD-10-CM

## 2024-03-04 PROCEDURE — 67028 INJECTION EYE DRUG: CPT | Performed by: OPHTHALMOLOGY

## 2024-03-04 PROCEDURE — 92134 CPTRZ OPH DX IMG PST SGM RTA: CPT | Performed by: OPHTHALMOLOGY

## 2024-03-04 ASSESSMENT — INTRAOCULAR PRESSURE
OD: 15
OS: 15

## 2024-03-28 ENCOUNTER — OFFICE VISIT (OUTPATIENT)
Dept: URBAN - METROPOLITAN AREA CLINIC 24 | Facility: CLINIC | Age: 69
End: 2024-03-28
Payer: COMMERCIAL

## 2024-03-28 DIAGNOSIS — Z79.4 LONG TERM (CURRENT) USE OF INSULIN: ICD-10-CM

## 2024-03-28 DIAGNOSIS — H25.813 COMBINED FORMS OF AGE-RELATED CATARACT, BILATERAL: ICD-10-CM

## 2024-03-28 DIAGNOSIS — E11.3413 TYPE 2 DIABETES MELLITUS WITH SEVERE NONPROLIFERATIVE DIABETIC RETINOPATHY WITH MACULAR EDEMA, BILATERAL: Primary | ICD-10-CM

## 2024-03-28 PROCEDURE — 99204 OFFICE O/P NEW MOD 45 MIN: CPT | Performed by: STUDENT IN AN ORGANIZED HEALTH CARE EDUCATION/TRAINING PROGRAM

## 2024-03-28 PROCEDURE — 92134 CPTRZ OPH DX IMG PST SGM RTA: CPT | Performed by: STUDENT IN AN ORGANIZED HEALTH CARE EDUCATION/TRAINING PROGRAM

## 2024-03-28 PROCEDURE — 92250 FUNDUS PHOTOGRAPHY W/I&R: CPT | Performed by: STUDENT IN AN ORGANIZED HEALTH CARE EDUCATION/TRAINING PROGRAM

## 2024-03-28 ASSESSMENT — KERATOMETRY
OS: 40.95
OD: 40.27

## 2024-03-28 ASSESSMENT — VISUAL ACUITY
OS: 20/50
OD: 20/30

## 2024-03-28 ASSESSMENT — INTRAOCULAR PRESSURE
OD: 14
OS: 15

## 2024-04-01 ENCOUNTER — OFFICE VISIT (OUTPATIENT)
Dept: URBAN - METROPOLITAN AREA CLINIC 24 | Facility: CLINIC | Age: 69
End: 2024-04-01
Payer: COMMERCIAL

## 2024-04-01 DIAGNOSIS — E11.3413 TYPE 2 DIABETES MELLITUS WITH SEVERE NONPROLIFERATIVE DIABETIC RETINOPATHY WITH MACULAR EDEMA, BILATERAL: Primary | ICD-10-CM

## 2024-04-01 DIAGNOSIS — Z79.4 LONG TERM (CURRENT) USE OF INSULIN: ICD-10-CM

## 2024-04-01 PROCEDURE — 92134 CPTRZ OPH DX IMG PST SGM RTA: CPT | Performed by: OPHTHALMOLOGY

## 2024-06-24 ENCOUNTER — OFFICE VISIT (OUTPATIENT)
Dept: URBAN - METROPOLITAN AREA CLINIC 24 | Facility: CLINIC | Age: 69
End: 2024-06-24
Payer: COMMERCIAL

## 2024-06-24 DIAGNOSIS — E11.3413 TYPE 2 DIABETES MELLITUS WITH SEVERE NONPROLIFERATIVE DIABETIC RETINOPATHY WITH MACULAR EDEMA, BILATERAL: Primary | ICD-10-CM

## 2024-06-24 DIAGNOSIS — Z79.4 LONG TERM (CURRENT) USE OF INSULIN: ICD-10-CM

## 2024-06-24 PROCEDURE — 92235 FLUORESCEIN ANGRPH MLTIFRAME: CPT | Performed by: OPHTHALMOLOGY

## 2024-06-24 PROCEDURE — 99214 OFFICE O/P EST MOD 30 MIN: CPT | Performed by: OPHTHALMOLOGY

## 2024-06-24 PROCEDURE — 92134 CPTRZ OPH DX IMG PST SGM RTA: CPT | Performed by: OPHTHALMOLOGY

## 2024-06-24 ASSESSMENT — INTRAOCULAR PRESSURE
OS: 15
OD: 14

## 2024-07-22 ENCOUNTER — OFFICE VISIT (OUTPATIENT)
Dept: URBAN - METROPOLITAN AREA CLINIC 24 | Facility: CLINIC | Age: 69
End: 2024-07-22
Payer: COMMERCIAL

## 2024-07-22 DIAGNOSIS — Z79.4 LONG TERM (CURRENT) USE OF INSULIN: ICD-10-CM

## 2024-07-22 DIAGNOSIS — E11.3413 TYPE 2 DIABETES MELLITUS WITH SEVERE NONPROLIFERATIVE DIABETIC RETINOPATHY WITH MACULAR EDEMA, BILATERAL: Primary | ICD-10-CM

## 2024-07-22 PROCEDURE — 67028 INJECTION EYE DRUG: CPT | Performed by: OPHTHALMOLOGY

## 2024-07-22 PROCEDURE — 92134 CPTRZ OPH DX IMG PST SGM RTA: CPT | Performed by: OPHTHALMOLOGY

## 2024-07-22 ASSESSMENT — INTRAOCULAR PRESSURE
OD: 11
OS: 10

## 2024-08-19 ENCOUNTER — OFFICE VISIT (OUTPATIENT)
Dept: URBAN - METROPOLITAN AREA CLINIC 24 | Facility: CLINIC | Age: 69
End: 2024-08-19
Payer: COMMERCIAL

## 2024-08-19 DIAGNOSIS — E11.3413 TYPE 2 DIABETES MELLITUS WITH SEVERE NONPROLIFERATIVE DIABETIC RETINOPATHY WITH MACULAR EDEMA, BILATERAL: Primary | ICD-10-CM

## 2024-08-19 DIAGNOSIS — Z79.4 LONG TERM (CURRENT) USE OF INSULIN: ICD-10-CM

## 2024-08-19 PROCEDURE — 92134 CPTRZ OPH DX IMG PST SGM RTA: CPT | Performed by: OPHTHALMOLOGY

## 2024-08-19 ASSESSMENT — INTRAOCULAR PRESSURE
OS: 9
OD: 10

## 2024-09-16 ENCOUNTER — OFFICE VISIT (OUTPATIENT)
Dept: URBAN - METROPOLITAN AREA CLINIC 24 | Facility: CLINIC | Age: 69
End: 2024-09-16
Payer: COMMERCIAL

## 2024-09-16 DIAGNOSIS — Z79.4 LONG TERM (CURRENT) USE OF INSULIN: ICD-10-CM

## 2024-09-16 DIAGNOSIS — E11.3413 TYPE 2 DIABETES MELLITUS WITH SEVERE NONPROLIFERATIVE DIABETIC RETINOPATHY WITH MACULAR EDEMA, BILATERAL: Primary | ICD-10-CM

## 2024-09-16 PROCEDURE — 99214 OFFICE O/P EST MOD 30 MIN: CPT | Performed by: OPHTHALMOLOGY

## 2024-09-16 PROCEDURE — 92134 CPTRZ OPH DX IMG PST SGM RTA: CPT | Performed by: OPHTHALMOLOGY

## 2024-09-16 PROCEDURE — 67028 INJECTION EYE DRUG: CPT | Performed by: OPHTHALMOLOGY

## 2024-09-16 ASSESSMENT — INTRAOCULAR PRESSURE
OD: 9
OS: 9

## 2024-12-09 ENCOUNTER — OFFICE VISIT (OUTPATIENT)
Dept: URBAN - METROPOLITAN AREA CLINIC 24 | Facility: CLINIC | Age: 69
End: 2024-12-09
Payer: COMMERCIAL

## 2024-12-09 DIAGNOSIS — Z79.4 LONG TERM (CURRENT) USE OF INSULIN: ICD-10-CM

## 2024-12-09 DIAGNOSIS — E11.3413 TYPE 2 DIABETES MELLITUS WITH SEVERE NONPROLIFERATIVE DIABETIC RETINOPATHY WITH MACULAR EDEMA, BILATERAL: Primary | ICD-10-CM

## 2024-12-09 PROCEDURE — 67210 TREATMENT OF RETINAL LESION: CPT | Performed by: OPHTHALMOLOGY

## 2024-12-09 PROCEDURE — 92134 CPTRZ OPH DX IMG PST SGM RTA: CPT | Performed by: OPHTHALMOLOGY

## 2024-12-09 ASSESSMENT — INTRAOCULAR PRESSURE
OD: 13
OS: 13

## 2025-01-14 ENCOUNTER — OFFICE VISIT (OUTPATIENT)
Dept: URBAN - METROPOLITAN AREA CLINIC 24 | Facility: CLINIC | Age: 70
End: 2025-01-14
Payer: COMMERCIAL

## 2025-01-14 DIAGNOSIS — E11.3413 TYPE 2 DIABETES MELLITUS WITH SEVERE NONPROLIFERATIVE DIABETIC RETINOPATHY WITH MACULAR EDEMA, BILATERAL: Primary | ICD-10-CM

## 2025-01-14 DIAGNOSIS — Z79.4 LONG TERM (CURRENT) USE OF INSULIN: ICD-10-CM

## 2025-01-14 PROCEDURE — 92134 CPTRZ OPH DX IMG PST SGM RTA: CPT | Performed by: OPHTHALMOLOGY

## 2025-01-14 PROCEDURE — 67028 INJECTION EYE DRUG: CPT | Performed by: OPHTHALMOLOGY

## 2025-01-14 ASSESSMENT — INTRAOCULAR PRESSURE
OS: 12
OD: 12

## 2025-02-11 ENCOUNTER — OFFICE VISIT (OUTPATIENT)
Dept: URBAN - METROPOLITAN AREA CLINIC 24 | Facility: CLINIC | Age: 70
End: 2025-02-11
Payer: COMMERCIAL

## 2025-02-11 DIAGNOSIS — E11.3413 TYPE 2 DIABETES MELLITUS WITH SEVERE NONPROLIFERATIVE DIABETIC RETINOPATHY WITH MACULAR EDEMA, BILATERAL: Primary | ICD-10-CM

## 2025-02-11 DIAGNOSIS — Z79.4 LONG TERM (CURRENT) USE OF INSULIN: ICD-10-CM

## 2025-02-11 PROCEDURE — 67028 INJECTION EYE DRUG: CPT | Performed by: OPHTHALMOLOGY

## 2025-02-11 PROCEDURE — 92134 CPTRZ OPH DX IMG PST SGM RTA: CPT | Performed by: OPHTHALMOLOGY

## 2025-02-11 ASSESSMENT — INTRAOCULAR PRESSURE
OS: 14
OD: 15

## 2025-05-06 ENCOUNTER — OFFICE VISIT (OUTPATIENT)
Dept: URBAN - METROPOLITAN AREA CLINIC 24 | Facility: CLINIC | Age: 70
End: 2025-05-06
Payer: COMMERCIAL

## 2025-05-06 DIAGNOSIS — E11.3413 TYPE 2 DIABETES MELLITUS WITH SEVERE NONPROLIFERATIVE DIABETIC RETINOPATHY WITH MACULAR EDEMA, BILATERAL: Primary | ICD-10-CM

## 2025-05-06 DIAGNOSIS — Z79.4 LONG TERM (CURRENT) USE OF INSULIN: ICD-10-CM

## 2025-05-06 PROCEDURE — 67028 INJECTION EYE DRUG: CPT | Performed by: OPHTHALMOLOGY

## 2025-05-06 PROCEDURE — 92134 CPTRZ OPH DX IMG PST SGM RTA: CPT | Performed by: OPHTHALMOLOGY

## 2025-05-06 ASSESSMENT — INTRAOCULAR PRESSURE
OD: 17
OS: 13

## 2025-06-03 ENCOUNTER — OFFICE VISIT (OUTPATIENT)
Dept: URBAN - METROPOLITAN AREA CLINIC 24 | Facility: CLINIC | Age: 70
End: 2025-06-03
Payer: COMMERCIAL

## 2025-06-03 DIAGNOSIS — E11.3413 TYPE 2 DIABETES MELLITUS WITH SEVERE NONPROLIFERATIVE DIABETIC RETINOPATHY WITH MACULAR EDEMA, BILATERAL: Primary | ICD-10-CM

## 2025-06-03 DIAGNOSIS — Z79.4 LONG TERM (CURRENT) USE OF INSULIN: ICD-10-CM

## 2025-06-03 PROCEDURE — 67028 INJECTION EYE DRUG: CPT | Performed by: OPHTHALMOLOGY

## 2025-06-03 PROCEDURE — 92134 CPTRZ OPH DX IMG PST SGM RTA: CPT | Performed by: OPHTHALMOLOGY

## 2025-06-03 ASSESSMENT — INTRAOCULAR PRESSURE
OS: 8
OD: 10

## 2025-07-01 ENCOUNTER — OFFICE VISIT (OUTPATIENT)
Dept: URBAN - METROPOLITAN AREA CLINIC 24 | Facility: CLINIC | Age: 70
End: 2025-07-01
Payer: COMMERCIAL

## 2025-07-01 DIAGNOSIS — Z79.4 LONG TERM (CURRENT) USE OF INSULIN: ICD-10-CM

## 2025-07-01 DIAGNOSIS — E11.3413 TYPE 2 DIABETES MELLITUS WITH SEVERE NONPROLIFERATIVE DIABETIC RETINOPATHY WITH MACULAR EDEMA, BILATERAL: Primary | ICD-10-CM

## 2025-07-01 DIAGNOSIS — H25.813 COMBINED FORMS OF AGE-RELATED CATARACT, BILATERAL: ICD-10-CM

## 2025-07-01 PROCEDURE — 99214 OFFICE O/P EST MOD 30 MIN: CPT | Performed by: OPHTHALMOLOGY

## 2025-07-01 PROCEDURE — 92134 CPTRZ OPH DX IMG PST SGM RTA: CPT | Performed by: OPHTHALMOLOGY

## 2025-07-01 PROCEDURE — 67028 INJECTION EYE DRUG: CPT | Performed by: OPHTHALMOLOGY

## 2025-07-01 ASSESSMENT — INTRAOCULAR PRESSURE
OS: 7
OD: 8

## 2025-07-16 ENCOUNTER — ADULT PHYSICAL (OUTPATIENT)
Facility: LOCATION | Age: 70
End: 2025-07-16
Payer: COMMERCIAL

## 2025-07-16 ENCOUNTER — TECH ONLY (OUTPATIENT)
Facility: LOCATION | Age: 70
End: 2025-07-16
Payer: COMMERCIAL

## 2025-07-16 DIAGNOSIS — Z01.818 ENCOUNTER FOR OTHER PREPROCEDURAL EXAMINATION: Primary | ICD-10-CM

## 2025-07-16 DIAGNOSIS — H25.813 COMBINED FORMS OF AGE-RELATED CATARACT, BILATERAL: ICD-10-CM

## 2025-07-16 DIAGNOSIS — E11.3413 TYPE 2 DIABETES MELLITUS WITH SEVERE NONPROLIFERATIVE DIABETIC RETINOPATHY WITH MACULAR EDEMA, BILATERAL: ICD-10-CM

## 2025-07-16 PROCEDURE — 99203 OFFICE O/P NEW LOW 30 MIN: CPT

## 2025-07-16 RX ORDER — INSULIN GLARGINE 100 [IU]/ML
INJECTION, SOLUTION SUBCUTANEOUS
Qty: 0 | Refills: 0 | Status: ACTIVE
Start: 2025-07-16

## 2025-07-16 RX ORDER — PANTOPRAZOLE SODIUM 40 MG/1
40 MG TABLET, DELAYED RELEASE ORAL
Qty: 0 | Refills: 0 | Status: ACTIVE
Start: 2025-07-16

## 2025-07-16 RX ORDER — AMINO ACIDS
CAPSULE ORAL
Qty: 0 | Refills: 0 | Status: ACTIVE
Start: 2025-07-16

## 2025-07-16 RX ORDER — ATORVASTATIN CALCIUM 40 MG/1
40 MG TABLET, FILM COATED ORAL
Qty: 0 | Refills: 0 | Status: ACTIVE
Start: 2025-07-16

## 2025-07-16 RX ORDER — LOSARTAN POTASSIUM 100 MG/1
100 MG TABLET, FILM COATED ORAL
Qty: 0 | Refills: 0 | Status: ACTIVE
Start: 2025-07-16

## 2025-07-16 RX ORDER — DAPAGLIFLOZIN 5 MG/1
5 MG TABLET, FILM COATED ORAL
Qty: 0 | Refills: 0 | Status: ACTIVE
Start: 2025-07-16

## 2025-07-16 RX ORDER — INSULIN LISPRO 200 [IU]/ML
INJECTION, SOLUTION SUBCUTANEOUS
Qty: 0 | Refills: 0 | Status: ACTIVE
Start: 2025-07-16

## 2025-07-23 ENCOUNTER — OFFICE VISIT (OUTPATIENT)
Dept: URBAN - METROPOLITAN AREA CLINIC 24 | Facility: CLINIC | Age: 70
End: 2025-07-23
Payer: COMMERCIAL

## 2025-07-23 DIAGNOSIS — H04.123 DRY EYE SYNDROME OF BILATERAL LACRIMAL GLANDS: ICD-10-CM

## 2025-07-23 DIAGNOSIS — Z79.4 LONG TERM (CURRENT) USE OF INSULIN: ICD-10-CM

## 2025-07-23 DIAGNOSIS — E11.3413 TYPE 2 DIABETES MELLITUS WITH SEVERE NONPROLIFERATIVE DIABETIC RETINOPATHY WITH MACULAR EDEMA, BILATERAL: ICD-10-CM

## 2025-07-23 DIAGNOSIS — Z98.890 OTHER SPECIFIED POSTPROCEDURAL STATES: ICD-10-CM

## 2025-07-23 DIAGNOSIS — H52.223 REGULAR ASTIGMATISM, BILATERAL: ICD-10-CM

## 2025-07-23 DIAGNOSIS — H25.811 COMBINED FORMS OF AGE-RELATED CATARACT, RIGHT EYE: ICD-10-CM

## 2025-07-23 DIAGNOSIS — H25.813 COMBINED FORMS OF AGE-RELATED CATARACT, BILATERAL: Primary | ICD-10-CM

## 2025-07-23 PROCEDURE — 99214 OFFICE O/P EST MOD 30 MIN: CPT | Performed by: OPHTHALMOLOGY

## 2025-07-23 RX ORDER — PREDNISOLONE ACETATE 10 MG/ML
1 % SUSPENSION/ DROPS OPHTHALMIC
Qty: 2.5 | Refills: 2 | Status: ACTIVE
Start: 2025-07-23

## 2025-07-23 RX ORDER — DICLOFENAC SODIUM 1 MG/ML
0.1 % SOLUTION/ DROPS OPHTHALMIC
Qty: 2.5 | Refills: 2 | Status: ACTIVE
Start: 2025-07-23

## 2025-07-23 ASSESSMENT — VISUAL ACUITY
OD: 20/50
OS: 20/300

## 2025-07-23 ASSESSMENT — INTRAOCULAR PRESSURE
OD: 14
OS: 16

## 2025-07-29 ENCOUNTER — OFFICE VISIT (OUTPATIENT)
Dept: URBAN - METROPOLITAN AREA CLINIC 24 | Facility: CLINIC | Age: 70
End: 2025-07-29
Payer: COMMERCIAL

## 2025-07-29 DIAGNOSIS — E11.3413 TYPE 2 DIABETES MELLITUS WITH SEVERE NONPROLIFERATIVE DIABETIC RETINOPATHY WITH MACULAR EDEMA, BILATERAL: Primary | ICD-10-CM

## 2025-07-29 DIAGNOSIS — Z79.4 LONG TERM (CURRENT) USE OF INSULIN: ICD-10-CM

## 2025-07-29 DIAGNOSIS — H25.813 COMBINED FORMS OF AGE-RELATED CATARACT, BILATERAL: ICD-10-CM

## 2025-07-29 PROCEDURE — 92134 CPTRZ OPH DX IMG PST SGM RTA: CPT | Performed by: OPHTHALMOLOGY

## 2025-07-29 PROCEDURE — 67028 INJECTION EYE DRUG: CPT | Performed by: OPHTHALMOLOGY

## 2025-07-29 ASSESSMENT — INTRAOCULAR PRESSURE
OD: 9
OS: 9

## 2025-08-04 ENCOUNTER — SURGERY (OUTPATIENT)
Facility: LOCATION | Age: 70
End: 2025-08-04
Payer: COMMERCIAL

## 2025-08-04 PROCEDURE — 66984 XCAPSL CTRC RMVL W/O ECP: CPT | Performed by: OPHTHALMOLOGY

## 2025-08-05 ENCOUNTER — POST-OPERATIVE VISIT (OUTPATIENT)
Dept: URBAN - METROPOLITAN AREA CLINIC 26 | Facility: CLINIC | Age: 70
End: 2025-08-05
Payer: COMMERCIAL

## 2025-08-05 DIAGNOSIS — Z48.810 ENCOUNTER FOR SURGICAL AFTERCARE FOLLOWING SURGERY ON A SENSE ORGAN: Primary | ICD-10-CM

## 2025-08-05 PROCEDURE — 99024 POSTOP FOLLOW-UP VISIT: CPT | Performed by: OPTOMETRIST

## 2025-08-05 ASSESSMENT — INTRAOCULAR PRESSURE: OS: 24

## 2025-08-11 ENCOUNTER — POST-OPERATIVE VISIT (OUTPATIENT)
Dept: URBAN - METROPOLITAN AREA CLINIC 26 | Facility: CLINIC | Age: 70
End: 2025-08-11
Payer: COMMERCIAL

## 2025-08-11 DIAGNOSIS — Z48.810 ENCOUNTER FOR SURGICAL AFTERCARE FOLLOWING SURGERY ON A SENSE ORGAN: Primary | ICD-10-CM

## 2025-08-11 PROCEDURE — 99024 POSTOP FOLLOW-UP VISIT: CPT | Performed by: OPTOMETRIST

## 2025-08-11 ASSESSMENT — INTRAOCULAR PRESSURE
OS: 11
OD: 11

## 2025-08-11 ASSESSMENT — KERATOMETRY
OS: 39.63
OD: 40.25

## 2025-08-20 ENCOUNTER — OFFICE VISIT (OUTPATIENT)
Dept: URBAN - METROPOLITAN AREA CLINIC 24 | Facility: CLINIC | Age: 70
End: 2025-08-20
Payer: COMMERCIAL

## 2025-08-20 DIAGNOSIS — H27.132 POSTERIOR DISLOCATION OF LENS, LEFT EYE: Primary | ICD-10-CM

## 2025-08-20 DIAGNOSIS — H43.813 VITREOUS DEGENERATION, BILATERAL: ICD-10-CM

## 2025-08-20 DIAGNOSIS — E11.3413 TYPE 2 DIABETES MELLITUS WITH SEVERE NONPROLIFERATIVE DIABETIC RETINOPATHY WITH MACULAR EDEMA, BILATERAL: ICD-10-CM

## 2025-08-20 PROCEDURE — 99214 OFFICE O/P EST MOD 30 MIN: CPT | Performed by: OPHTHALMOLOGY

## 2025-08-20 PROCEDURE — 92134 CPTRZ OPH DX IMG PST SGM RTA: CPT | Performed by: OPHTHALMOLOGY

## 2025-08-20 RX ORDER — OFLOXACIN 3 MG/ML
0.3 % SOLUTION/ DROPS OPHTHALMIC
Qty: 1 | Refills: 1 | Status: ACTIVE
Start: 2025-08-20

## 2025-08-20 ASSESSMENT — INTRAOCULAR PRESSURE
OD: 8
OS: 11

## 2025-08-27 ENCOUNTER — SURGERY (OUTPATIENT)
Facility: LOCATION | Age: 70
End: 2025-08-27
Payer: COMMERCIAL

## 2025-08-27 PROCEDURE — 67039 LASER TREATMENT OF RETINA: CPT | Performed by: OPHTHALMOLOGY

## 2025-08-28 ENCOUNTER — POST-OPERATIVE VISIT (OUTPATIENT)
Dept: URBAN - METROPOLITAN AREA CLINIC 26 | Facility: CLINIC | Age: 70
End: 2025-08-28
Payer: COMMERCIAL

## 2025-08-28 DIAGNOSIS — Z48.810 ENCOUNTER FOR SURGICAL AFTERCARE FOLLOWING SURGERY ON A SENSE ORGAN: Primary | ICD-10-CM

## 2025-08-28 PROCEDURE — 99024 POSTOP FOLLOW-UP VISIT: CPT | Performed by: OPTOMETRIST

## 2025-08-28 ASSESSMENT — INTRAOCULAR PRESSURE: OS: 12
